# Patient Record
Sex: FEMALE | Race: WHITE | NOT HISPANIC OR LATINO | URBAN - METROPOLITAN AREA
[De-identification: names, ages, dates, MRNs, and addresses within clinical notes are randomized per-mention and may not be internally consistent; named-entity substitution may affect disease eponyms.]

---

## 2023-05-09 ENCOUNTER — APPOINTMENT (RX ONLY)
Dept: URBAN - METROPOLITAN AREA CLINIC 328 | Facility: CLINIC | Age: 33
Setting detail: DERMATOLOGY
End: 2023-05-09

## 2023-05-09 DIAGNOSIS — D22 MELANOCYTIC NEVI: ICD-10-CM

## 2023-05-09 DIAGNOSIS — L81.1 CHLOASMA: ICD-10-CM | Status: INADEQUATELY CONTROLLED

## 2023-05-09 DIAGNOSIS — L82.1 OTHER SEBORRHEIC KERATOSIS: ICD-10-CM

## 2023-05-09 DIAGNOSIS — D18.0 HEMANGIOMA: ICD-10-CM

## 2023-05-09 DIAGNOSIS — L82.0 INFLAMED SEBORRHEIC KERATOSIS: ICD-10-CM

## 2023-05-09 DIAGNOSIS — L81.4 OTHER MELANIN HYPERPIGMENTATION: ICD-10-CM

## 2023-05-09 PROBLEM — D18.01 HEMANGIOMA OF SKIN AND SUBCUTANEOUS TISSUE: Status: ACTIVE | Noted: 2023-05-09

## 2023-05-09 PROBLEM — D22.5 MELANOCYTIC NEVI OF TRUNK: Status: ACTIVE | Noted: 2023-05-09

## 2023-05-09 PROCEDURE — ? COUNSELING

## 2023-05-09 PROCEDURE — 99203 OFFICE O/P NEW LOW 30 MIN: CPT | Mod: 25

## 2023-05-09 PROCEDURE — ? LIQUID NITROGEN

## 2023-05-09 PROCEDURE — ? IN-HOUSE DISPENSING PHARMACY

## 2023-05-09 PROCEDURE — ? RECOMMENDATIONS

## 2023-05-09 PROCEDURE — 17110 DESTRUCTION B9 LES UP TO 14: CPT

## 2023-05-09 PROCEDURE — ? ADDITIONAL NOTES

## 2023-05-09 PROCEDURE — ? FULL BODY SKIN EXAM

## 2023-05-09 PROCEDURE — ? TREATMENT REGIMEN

## 2023-05-09 ASSESSMENT — LOCATION SIMPLE DESCRIPTION DERM
LOCATION SIMPLE: RIGHT FOREARM
LOCATION SIMPLE: ABDOMEN
LOCATION SIMPLE: LEFT SHOULDER
LOCATION SIMPLE: RIGHT LOWER BACK
LOCATION SIMPLE: LEFT UPPER BACK
LOCATION SIMPLE: LEFT CHEEK

## 2023-05-09 ASSESSMENT — LOCATION DETAILED DESCRIPTION DERM
LOCATION DETAILED: LEFT MID-UPPER BACK
LOCATION DETAILED: LEFT ANTERIOR SHOULDER
LOCATION DETAILED: RIGHT SUPERIOR MEDIAL MIDBACK
LOCATION DETAILED: EPIGASTRIC SKIN
LOCATION DETAILED: RIGHT DISTAL DORSAL FOREARM
LOCATION DETAILED: LEFT INFERIOR CENTRAL MALAR CHEEK

## 2023-05-09 ASSESSMENT — LOCATION ZONE DERM
LOCATION ZONE: TRUNK
LOCATION ZONE: ARM
LOCATION ZONE: FACE

## 2023-05-09 NOTE — PROCEDURE: ADDITIONAL NOTES
Render Risk Assessment In Note?: no
Detail Level: Simple
Additional Notes: Patient consent was obtained to proceed with the visit and recommended plan of care after discussion of all risks and benefits, including the risks of COVID-19 exposure
2 story home/alone

## 2023-05-09 NOTE — PROCEDURE: IN-HOUSE DISPENSING PHARMACY
Product 65 Price/Unit (In Dollars): 0
Product 8 Application Directions: Apply to the affected area twice daily.
Product 14 Abrams/Unit (In Dollars): 45
Name Of Product 4: #4 Acne Gel
Product 18 Amount/Unit (Numbers Only): 20
Product 56 Unit Type: mg
Product 23 Price/Unit (In Dollars): 10
Product 16 Application Directions: Apply to the affected area 1-2 times daily or as directed by provider.
Name Of Product 21: #21 Minocycline
Product 8 Unit Type: bottle(s)
Product 11 Amount/Unit (Numbers Only): 1
Product 9 Application Directions: Apply to the affected area three times weekly.
Product 2 Price/Unit (In Dollars): 50
Product 4 Application Directions: Apply to the face at bedtime or as directed by provider.
Product 19 Price/Unit (In Dollars): 55
Detail Level: Zone
Product 23 Amount/Unit (Numbers Only): 5
Name Of Product 17: #17 Xerosis Gel
Product 21 Application Directions: Take one tablet twice daily with food.
Product 12 Price/Unit (In Dollars): 60
Send Charges To Patient Encounter: Yes
Product 21 Unit Type: capsules
Name Of Product 10: #10 Fungal Dermatitis Cream
Product 15 Price/Unit (In Dollars): 30
Name Of Product 5: #5 Actinic Keratosis (Gel)
Render Product Pricing In Note: No
Product 12 Refills: 3
Name Of Product 13: #13 Rosacea Cream
Product 12 Application Directions: Apply to the face three times weekly at night for two months only. Take a two month break and then continue for two months again.
Product 7 Application Directions: Apply to the nails daily. Please do not apply socks for about5 hours (best if applied at night) Remove once weekly then start again.
Name Of Product 3: #3 Acne Gel Combo
Product 15 Application Directions: Apply to the scalp 2-3 times weekly. Can be applied every other shampoo as well.
Name Of Product 20: #20 Loratadine
Product 24 Application Directions: Take one tablet twice daily or as directed by provider.
Name Of Product 8: #8 Anti Fungal Cream
Product 3 Application Directions: Apply to the face once daily AM or PM or as directed by provider.
Product 22 Amount/Unit (Numbers Only): 18
Name Of Product 16: #16 Dermatitis Topical Solution
Product 20 Application Directions: Take one tablet daily.
Product 20 Unit Type: tablets
Name Of Product 9: #9 Dermatitis Cream
Product 11 Application Directions: Apply twice daily to wound.
Product 6 Application Directions: Apply to the scalp three times weekly
Name Of Product 2: #2 Acne Moisturizing Cream
Product 14 Application Directions: Apply to the face once daily AM or PM
Name Of Product 19: #19 Doxycycline
Product 23 Application Directions: Use as directed by provider.
Name Of Product 12: #12 Melasma Emulsion
Product 2 Application Directions: Apply to the face at bedtime. Start every other night then increase usage depending on tolerance.
Name Of Product 7: #7 Anti Fungal Nail Solution
Name Of Product 15: #15 Anti Fungal Shampoo
Product 19 Application Directions: Take one tablet twice daily with food or as directed by provider.
Name Of Product 24: #24 Spironolactone
Product 10 Price/Unit (In Dollars): 35
Product 17 Application Directions: Apply to affected area nightly.
Name Of Product 22: #22 Prednisone 20mg
Product 3 Price/Unit (In Dollars): 40
Product 10 Application Directions: Apply to the affected area twice daily for 10 days.
Product 5 Application Directions: For Warts/MC- apply to the areas 2-3 times weekly\\nFor (Pre)Skin Cancer- apply nightly for 2-6 weeks or as directed by provider.
Product 20 Price/Unit (In Dollars): 15
Name Of Product 1: #1 Acne Gel
Product 13 Application Directions: Apply to the face twice daily or as directed by provider.
Name Of Product 18: #18 Bactrim
Name Of Product 11: #11 Antibacterial Ointment
Name Of Product 6: #6 Alopecia Topical Solution
Product 1 Application Directions: Apply to the face twice daily (if another rx is being prescribed it will be applied in the AM)
Name Of Product 14: #14 Rosacea Silicone Gel
Name Of Product 23: #23 Prednisone 50mg

## 2023-05-09 NOTE — PROCEDURE: RECOMMENDATIONS
Recommendation Preamble: The following recommendations were made during the visit: [employer]
Render Risk Assessment In Note?: no
Detail Level: Simple